# Patient Record
Sex: MALE | Race: WHITE | NOT HISPANIC OR LATINO
[De-identification: names, ages, dates, MRNs, and addresses within clinical notes are randomized per-mention and may not be internally consistent; named-entity substitution may affect disease eponyms.]

---

## 2018-04-30 PROBLEM — Z00.00 ENCOUNTER FOR PREVENTIVE HEALTH EXAMINATION: Status: ACTIVE | Noted: 2018-04-30

## 2018-05-24 ENCOUNTER — RECORD ABSTRACTING (OUTPATIENT)
Age: 68
End: 2018-05-24

## 2018-05-24 DIAGNOSIS — Z87.19 PERSONAL HISTORY OF OTHER DISEASES OF THE DIGESTIVE SYSTEM: ICD-10-CM

## 2018-05-24 DIAGNOSIS — Z85.46 PERSONAL HISTORY OF MALIGNANT NEOPLASM OF PROSTATE: ICD-10-CM

## 2018-05-24 DIAGNOSIS — Z80.0 FAMILY HISTORY OF MALIGNANT NEOPLASM OF DIGESTIVE ORGANS: ICD-10-CM

## 2018-05-24 DIAGNOSIS — Z82.49 FAMILY HISTORY OF ISCHEMIC HEART DISEASE AND OTHER DISEASES OF THE CIRCULATORY SYSTEM: ICD-10-CM

## 2018-05-25 ENCOUNTER — APPOINTMENT (OUTPATIENT)
Dept: CARDIOLOGY | Facility: CLINIC | Age: 68
End: 2018-05-25

## 2018-05-25 VITALS
BODY MASS INDEX: 21.05 KG/M2 | WEIGHT: 147 LBS | SYSTOLIC BLOOD PRESSURE: 120 MMHG | DIASTOLIC BLOOD PRESSURE: 76 MMHG | HEART RATE: 68 BPM | HEIGHT: 70 IN

## 2018-07-22 ENCOUNTER — OUTPATIENT (OUTPATIENT)
Dept: OUTPATIENT SERVICES | Facility: HOSPITAL | Age: 68
LOS: 1 days | Discharge: HOME | End: 2018-07-22

## 2018-07-22 DIAGNOSIS — M25.561 PAIN IN RIGHT KNEE: ICD-10-CM

## 2018-08-02 ENCOUNTER — APPOINTMENT (OUTPATIENT)
Dept: CARDIOLOGY | Facility: CLINIC | Age: 68
End: 2018-08-02

## 2018-09-06 ENCOUNTER — APPOINTMENT (OUTPATIENT)
Dept: CARDIOLOGY | Facility: CLINIC | Age: 68
End: 2018-09-06

## 2018-10-19 ENCOUNTER — APPOINTMENT (OUTPATIENT)
Dept: CARDIOLOGY | Facility: CLINIC | Age: 68
End: 2018-10-19

## 2018-10-22 ENCOUNTER — APPOINTMENT (OUTPATIENT)
Dept: CARDIOLOGY | Facility: CLINIC | Age: 68
End: 2018-10-22

## 2018-10-22 VITALS
HEIGHT: 70 IN | WEIGHT: 145 LBS | BODY MASS INDEX: 20.76 KG/M2 | DIASTOLIC BLOOD PRESSURE: 78 MMHG | SYSTOLIC BLOOD PRESSURE: 116 MMHG | HEART RATE: 54 BPM

## 2018-11-22 ENCOUNTER — FORM ENCOUNTER (OUTPATIENT)
Age: 68
End: 2018-11-22

## 2018-11-23 ENCOUNTER — OUTPATIENT (OUTPATIENT)
Dept: OUTPATIENT SERVICES | Facility: HOSPITAL | Age: 68
LOS: 1 days | Discharge: HOME | End: 2018-11-23

## 2018-11-23 DIAGNOSIS — R06.02 SHORTNESS OF BREATH: ICD-10-CM

## 2019-01-06 ENCOUNTER — OUTPATIENT (OUTPATIENT)
Dept: OUTPATIENT SERVICES | Facility: HOSPITAL | Age: 69
LOS: 1 days | Discharge: HOME | End: 2019-01-06

## 2019-01-06 DIAGNOSIS — K76.89 OTHER SPECIFIED DISEASES OF LIVER: ICD-10-CM

## 2019-11-02 ENCOUNTER — APPOINTMENT (OUTPATIENT)
Dept: CARDIOLOGY | Facility: CLINIC | Age: 69
End: 2019-11-02
Payer: MEDICARE

## 2019-11-02 VITALS
BODY MASS INDEX: 20.33 KG/M2 | HEIGHT: 70 IN | HEART RATE: 50 BPM | SYSTOLIC BLOOD PRESSURE: 112 MMHG | DIASTOLIC BLOOD PRESSURE: 69 MMHG | WEIGHT: 142 LBS

## 2019-11-02 PROCEDURE — 99214 OFFICE O/P EST MOD 30 MIN: CPT

## 2019-11-02 PROCEDURE — 93000 ELECTROCARDIOGRAM COMPLETE: CPT

## 2019-11-02 NOTE — DISCUSSION/SUMMARY
[FreeTextEntry1] : spoke with patient . CAC score of 0 . He did have findings in the liver. Advised him to see his gastroenterologist.

## 2019-11-02 NOTE — ASSESSMENT
[FreeTextEntry1] : The patient has had no chest pain . ETT echo was negative and he has a CAC score of 0. He does have a higher 10 year risk, He has prostate CA .

## 2019-11-02 NOTE — HISTORY OF PRESENT ILLNESS
[FreeTextEntry1] : The patient has been feeling well. He had been on Lupron  for prostate CA . He had a cac score of 0.

## 2019-11-02 NOTE — PHYSICAL EXAM
[General Appearance - Well Developed] : well developed [Normal Appearance] : normal appearance [Well Groomed] : well groomed [General Appearance - Well Nourished] : well nourished [No Deformities] : no deformities [General Appearance - In No Acute Distress] : no acute distress [Normal Conjunctiva] : the conjunctiva exhibited no abnormalities [Eyelids - No Xanthelasma] : the eyelids demonstrated no xanthelasmas [Normal Oral Mucosa] : normal oral mucosa [No Oral Pallor] : no oral pallor [No Oral Cyanosis] : no oral cyanosis [Abdomen Soft] : soft [Abdomen Tenderness] : non-tender [Abdomen Mass (___ Cm)] : no abdominal mass palpated [Abnormal Walk] : normal gait [Gait - Sufficient For Exercise Testing] : the gait was sufficient for exercise testing [Nail Clubbing] : no clubbing of the fingernails [Cyanosis, Localized] : no localized cyanosis [Petechial Hemorrhages (___cm)] : no petechial hemorrhages [Skin Color & Pigmentation] : normal skin color and pigmentation [] : no rash [No Venous Stasis] : no venous stasis [Skin Lesions] : no skin lesions [No Skin Ulcers] : no skin ulcer [No Xanthoma] : no  xanthoma was observed [Oriented To Time, Place, And Person] : oriented to person, place, and time [Affect] : the affect was normal [Mood] : the mood was normal [No Anxiety] : not feeling anxious [Normal Rate] : normal [Rhythm Regular] : regular [No Murmur] : no murmurs heard [2+] : left 2+ [___ +] : bilateral [unfilled]U+ pitting edema to the ankles [FreeTextEntry1] : No JVD  [Rt] : no varicose veins of the right leg [Lt] : no varicose veins of the left leg

## 2020-12-02 ENCOUNTER — APPOINTMENT (OUTPATIENT)
Dept: CARDIOLOGY | Facility: CLINIC | Age: 70
End: 2020-12-02
Payer: MEDICARE

## 2020-12-02 VITALS
TEMPERATURE: 97.3 F | HEART RATE: 61 BPM | HEIGHT: 70 IN | SYSTOLIC BLOOD PRESSURE: 118 MMHG | WEIGHT: 133 LBS | BODY MASS INDEX: 19.04 KG/M2 | DIASTOLIC BLOOD PRESSURE: 60 MMHG

## 2020-12-02 PROCEDURE — 99214 OFFICE O/P EST MOD 30 MIN: CPT

## 2020-12-02 PROCEDURE — 93000 ELECTROCARDIOGRAM COMPLETE: CPT

## 2020-12-15 ENCOUNTER — TRANSCRIPTION ENCOUNTER (OUTPATIENT)
Age: 70
End: 2020-12-15

## 2021-11-11 ENCOUNTER — RESULT CHARGE (OUTPATIENT)
Age: 71
End: 2021-11-11

## 2021-11-11 ENCOUNTER — APPOINTMENT (OUTPATIENT)
Dept: CARDIOLOGY | Facility: CLINIC | Age: 71
End: 2021-11-11
Payer: MEDICARE

## 2021-11-11 VITALS
TEMPERATURE: 97.3 F | DIASTOLIC BLOOD PRESSURE: 60 MMHG | WEIGHT: 135 LBS | BODY MASS INDEX: 19.33 KG/M2 | HEART RATE: 54 BPM | SYSTOLIC BLOOD PRESSURE: 100 MMHG | HEIGHT: 70 IN

## 2021-11-11 DIAGNOSIS — R60.9 EDEMA, UNSPECIFIED: ICD-10-CM

## 2021-11-11 PROCEDURE — 99214 OFFICE O/P EST MOD 30 MIN: CPT

## 2021-11-11 PROCEDURE — 93000 ELECTROCARDIOGRAM COMPLETE: CPT

## 2021-11-11 NOTE — CARDIOLOGY SUMMARY
[___] : [unfilled] [de-identified] : 11- Sinus Bradycardia LAFB Sinus arrythmia  [de-identified] : From Dr. Kelly's office 9-8-2021 Normal Lv systolic function . trace MR and TR .

## 2021-11-11 NOTE — ASSESSMENT
[FreeTextEntry1] : The patient has had no chest pain . ETT echo was negative and he has a CAC score of 0. He does have a higher 10 year risk, He has prostate CA . LDL is now at goal.

## 2021-11-11 NOTE — REASON FOR VISIT
[CV Risk Factors and Non-Cardiac Disease] : CV risk factors and non-cardiac disease [FreeTextEntry3] : raciel

## 2022-11-17 ENCOUNTER — APPOINTMENT (OUTPATIENT)
Dept: CARDIOLOGY | Facility: CLINIC | Age: 72
End: 2022-11-17

## 2022-11-17 VITALS
OXYGEN SATURATION: 98 % | HEIGHT: 70 IN | WEIGHT: 128 LBS | BODY MASS INDEX: 18.32 KG/M2 | SYSTOLIC BLOOD PRESSURE: 106 MMHG | HEART RATE: 52 BPM | DIASTOLIC BLOOD PRESSURE: 62 MMHG | TEMPERATURE: 97.6 F

## 2022-11-17 DIAGNOSIS — E78.5 HYPERLIPIDEMIA, UNSPECIFIED: ICD-10-CM

## 2022-11-17 PROCEDURE — 99214 OFFICE O/P EST MOD 30 MIN: CPT

## 2022-11-17 PROCEDURE — 93000 ELECTROCARDIOGRAM COMPLETE: CPT

## 2022-11-17 NOTE — CARDIOLOGY SUMMARY
[___] : [unfilled] [de-identified] : 11- Sinus Bradycardia ARIAB Sinus arrythmia \par 11- ANDRE ARREOLA  [de-identified] : From Dr. Kelly's office 9-8-2021 Normal Lv systolic function . trace MR and TR .

## 2022-11-17 NOTE — ASSESSMENT
[FreeTextEntry1] : The patinet has been feeling well. He has normal BP and LDL is at goal. He exercises at the gym daily without issues .

## 2022-11-17 NOTE — HISTORY OF PRESENT ILLNESS
[FreeTextEntry1] : The patient has been feeling well. No chest pain No SOB . No change LDL has been at goal

## 2022-12-03 ENCOUNTER — OUTPATIENT (OUTPATIENT)
Dept: OUTPATIENT SERVICES | Facility: HOSPITAL | Age: 72
LOS: 1 days | Discharge: HOME | End: 2022-12-03

## 2022-12-03 DIAGNOSIS — E78.5 HYPERLIPIDEMIA, UNSPECIFIED: ICD-10-CM

## 2022-12-03 PROCEDURE — 75571 CT HRT W/O DYE W/CA TEST: CPT | Mod: 26,MH

## 2023-07-24 ENCOUNTER — APPOINTMENT (OUTPATIENT)
Dept: ORTHOPEDIC SURGERY | Facility: CLINIC | Age: 73
End: 2023-07-24
Payer: MEDICARE

## 2023-07-24 VITALS — BODY MASS INDEX: 19.26 KG/M2 | WEIGHT: 130 LBS | HEIGHT: 69 IN

## 2023-07-24 PROCEDURE — 99202 OFFICE O/P NEW SF 15 MIN: CPT

## 2023-07-24 NOTE — HISTORY OF PRESENT ILLNESS
[de-identified] : 73-year-old male comes the office today for evaluation regarding his left hand numbness and tingling into the left hand can wake him up from sleep he reports the numbness comes from his shoulder on down to the hand not the other direction.  Has had no treatment or evaluation for the condition.

## 2023-07-24 NOTE — PHYSICAL EXAM
[de-identified] : Patient has negative Tinel's and median nerve compression test bilaterally.  Patient has no swelling erythema ecchymoses or abrasions.  Patient has no masses.  He has no exacerbation of symptoms with flexion or extension of the neck or rotation of the neck as well.

## 2023-07-24 NOTE — ASSESSMENT
[FreeTextEntry1] : Patient may have left-sided carpal tunnel syndrome.  Though the distribution of in the process with the numbness is not classic for that.  Discussed with him getting an EMG test as this would be the test for evaluation of this.  He will contact us back after testing is performed.

## 2023-08-14 ENCOUNTER — APPOINTMENT (OUTPATIENT)
Dept: NEUROLOGY | Facility: CLINIC | Age: 73
End: 2023-08-14
Payer: MEDICARE

## 2023-08-14 PROCEDURE — 95911 NRV CNDJ TEST 9-10 STUDIES: CPT

## 2023-08-14 PROCEDURE — 95886 MUSC TEST DONE W/N TEST COMP: CPT

## 2023-11-16 ENCOUNTER — APPOINTMENT (OUTPATIENT)
Dept: CARDIOLOGY | Facility: CLINIC | Age: 73
End: 2023-11-16
Payer: MEDICARE

## 2023-11-16 VITALS — BODY MASS INDEX: 18.46 KG/M2 | WEIGHT: 125 LBS

## 2023-11-16 VITALS
HEIGHT: 69 IN | DIASTOLIC BLOOD PRESSURE: 74 MMHG | SYSTOLIC BLOOD PRESSURE: 120 MMHG | HEART RATE: 60 BPM | TEMPERATURE: 97.6 F

## 2023-11-16 DIAGNOSIS — Z91.89 OTHER SPECIFIED PERSONAL RISK FACTORS, NOT ELSEWHERE CLASSIFIED: ICD-10-CM

## 2023-11-16 DIAGNOSIS — E78.5 HYPERLIPIDEMIA, UNSPECIFIED: ICD-10-CM

## 2023-11-16 PROCEDURE — 93000 ELECTROCARDIOGRAM COMPLETE: CPT

## 2023-11-16 PROCEDURE — 99214 OFFICE O/P EST MOD 30 MIN: CPT

## 2024-02-17 ENCOUNTER — APPOINTMENT (OUTPATIENT)
Dept: ORTHOPEDIC SURGERY | Facility: CLINIC | Age: 74
End: 2024-02-17
Payer: MEDICARE

## 2024-02-17 VITALS — BODY MASS INDEX: 19.26 KG/M2 | WEIGHT: 130 LBS | HEIGHT: 69 IN

## 2024-02-17 PROCEDURE — 29085 APPL CAST HAND&LWR FOREARM: CPT | Mod: RT

## 2024-02-17 PROCEDURE — 99213 OFFICE O/P EST LOW 20 MIN: CPT | Mod: 25

## 2024-02-22 NOTE — DATA REVIEWED
[FreeTextEntry1] : X-rays of the right wrist were reviewed from [facility] which reveal an acute closed nondisplaced right scaphoid waist fracture.  No subluxations or dislocations.

## 2024-02-22 NOTE — IMAGING
[de-identified] : Physical examination of right wrist: Mild swelling appreciated throughout.  Mild ecchymosis appreciated over the distal radius.  No erythema appreciated.  Skin is intact.  Tense palpation over the right distal radius.  Significant tenderness over the scaphoid appreciated.  Decreased range of motion secondary to pain and swelling.  Sensorimotor intact distally.  Neuro vas intact.  No tenderness of the DRUJ.  No tenderness at the TFCC.  Range of motion limited secondary to pain and swelling.

## 2024-02-22 NOTE — DISCUSSION/SUMMARY
[de-identified] : Patient has an acute closed nondisplaced fracture of the scaphoid of the right wrist as evidenced on x-rays from [facility].  I placed the patient in a well molded well-fitting fiberglass thumb spica cast in the office today.  He tolerated casting procedure well.  Will send the patient for a CAT scan of the right wrist to further evaluate for the level of displacement, if any, of his current scaphoid fracture.  Treatment plans were discussed with patient that which include conservative management with casting and/or surgery if applicable pending his CAT scan results.  Patient expresses full understanding.  Red flag symptoms discussed.  He understands not get the cast wet.  I have the patient follow-up ASAP with [facility] following his CAT scan to discuss further evaluation and treatment.  All questions and concerns addressed to patient's satisfaction. Patient expresses full understanding of treatment plan.

## 2024-02-22 NOTE — HISTORY OF PRESENT ILLNESS
[de-identified] : 74-year-old male here for evaluation status post a fall on outstretched right wrist with a subsequent right scaphoid fracture.  He was seen at [facility] where x-rays taken which confirmed an acute closed nondisplaced fracture of the waist of the scaphoid of the right wrist.  He was placed in a splint and advised to follow-up in orthopedic specialist.  His pain is well-controlled at this time.

## 2024-02-23 ENCOUNTER — APPOINTMENT (OUTPATIENT)
Dept: ORTHOPEDIC SURGERY | Facility: CLINIC | Age: 74
End: 2024-02-23
Payer: MEDICARE

## 2024-02-23 PROCEDURE — 99202 OFFICE O/P NEW SF 15 MIN: CPT

## 2024-02-23 NOTE — DATA REVIEWED
[FreeTextEntry1] : Radiographs 3 views of the left wrist are reviewed from his previous visit to document a nondisplaced scaphoid fracture.  And his CAT scan is reviewed as well.  Films.  Documenting the nondisplaced nature of the scaphoid fracture with significant STT arthritis

## 2024-02-23 NOTE — HISTORY OF PRESENT ILLNESS
[de-identified] : 74-year-old male had fallen resulting injury to his right wrist.  Comes in today for evaluation.  X-rays and CAT scan confirmed the presence of a nondisplaced scaphoid fracture.  Patient has been seen in the past as well for carpal tunnel syndrome.  He is reporting intermittent numbness and tingling in the fingers.

## 2024-02-23 NOTE — PHYSICAL EXAM
[de-identified] : Patient is in a short arm thumb spica cast.  He has good range of motion to the fingers.  He does have mildly positive Tinel's and median nerve compression test on the left side.  Good thenar strength without thenar atrophy.

## 2024-03-29 ENCOUNTER — APPOINTMENT (OUTPATIENT)
Dept: ORTHOPEDIC SURGERY | Facility: CLINIC | Age: 74
End: 2024-03-29
Payer: MEDICARE

## 2024-03-29 DIAGNOSIS — G56.02 CARPAL TUNNEL SYNDROME, LEFT UPPER LIMB: ICD-10-CM

## 2024-03-29 PROCEDURE — L3908: CPT | Mod: KX,RT

## 2024-03-29 PROCEDURE — 99213 OFFICE O/P EST LOW 20 MIN: CPT

## 2024-03-29 PROCEDURE — 73110 X-RAY EXAM OF WRIST: CPT | Mod: RT

## 2024-03-29 NOTE — DATA REVIEWED
[FreeTextEntry1] : Radiographs 4 views of the right wrist reviewed showing healing of his scaphoid fracture there is no lucency around the scaphoid there is significant STT arthritis with an extended lunate  EMG test is reviewed which shows left worse than right carpal tunnel syndrome

## 2024-03-29 NOTE — PHYSICAL EXAM
[de-identified] : Patient is removed from his immobilization.  Patient has no tenderness about the scaphoid.  There is deformity consistent with arthritis.  There is no erythema ecchymoses or abrasions.  His skin is intact.  On the left side he has mildly positive Tinel's median nerve compression test with good range of motion to the fingers

## 2024-03-29 NOTE — HISTORY OF PRESENT ILLNESS
[de-identified] : 74-year-old male has a right-sided scaphoid fracture.  He was treated in a cast.  He is coming out of the cast today.  He also is complaining about numbness and tingling on the left hand.  It does wake him up and sleep at night as well.  This has been going on for several months.

## 2024-03-29 NOTE — ASSESSMENT
[FreeTextEntry1] : Patient is contemplating intervention for his carpal tunnel syndrome.  Surgical intervention for treatment was discussed.  Recovery was discussed.  He is contemplating getting that procedure done.  In terms of his right wrist he will go into a removable wrist brace.  Start range of motion exercise and stretching exercises.  Any other issues or problems contact the office he will see us in 6 weeks with a repeat radiograph of his right wrist

## 2024-05-10 ENCOUNTER — APPOINTMENT (OUTPATIENT)
Dept: ORTHOPEDIC SURGERY | Facility: CLINIC | Age: 74
End: 2024-05-10
Payer: MEDICARE

## 2024-05-10 DIAGNOSIS — S62.024A NONDISPLACED FRACTURE OF MIDDLE THIRD OF NAVICULAR [SCAPHOID] BONE OF RIGHT WRIST, INITIAL ENCOUNTER FOR CLOSED FRACTURE: ICD-10-CM

## 2024-05-10 PROCEDURE — 73110 X-RAY EXAM OF WRIST: CPT | Mod: RT

## 2024-05-10 PROCEDURE — 99213 OFFICE O/P EST LOW 20 MIN: CPT

## 2024-05-10 NOTE — HISTORY OF PRESENT ILLNESS
[de-identified] : 74-year-old male is being treated for a right-sided scaphoid fracture.  He also is having numbness and tingling in the fingers.  Mostly tingling does not bother him as much anymore.  And his wrist pain is not significant at this time.

## 2024-05-10 NOTE — ASSESSMENT
[FreeTextEntry1] : Patient had a scaphoid fracture in light of a significant wrist with STT arthritis he also had left-sided carpal tunnel syndrome.  No surgical intervention being done for either condition.  He will see us back on an as-needed basis.  Indications for surgery discussed again with the patient.

## 2024-05-10 NOTE — PHYSICAL EXAM
[de-identified] : Patient is good range of motion to the wrist and hand.  He has no significant tenderness in the anatomic snuffbox.  He does have some deformity consistent with STT arthritis.  No erythema ecchymoses or abrasions

## 2024-05-10 NOTE — DATA REVIEWED
[FreeTextEntry1] : Radiographs 3 views of the right wrist are reviewed showing significant STT arthritis but healed scaphoid fracture

## 2024-07-09 DIAGNOSIS — Z86.39 PERSONAL HISTORY OF OTHER ENDOCRINE, NUTRITIONAL AND METABOLIC DISEASE: ICD-10-CM

## 2024-11-07 ENCOUNTER — APPOINTMENT (OUTPATIENT)
Dept: CARDIOLOGY | Facility: CLINIC | Age: 74
End: 2024-11-07
Payer: MEDICARE

## 2024-11-07 VITALS — HEART RATE: 60 BPM | DIASTOLIC BLOOD PRESSURE: 70 MMHG | SYSTOLIC BLOOD PRESSURE: 118 MMHG

## 2024-11-07 VITALS — BODY MASS INDEX: 18.81 KG/M2 | HEIGHT: 69 IN | WEIGHT: 127 LBS

## 2024-11-07 PROCEDURE — 99214 OFFICE O/P EST MOD 30 MIN: CPT

## 2024-11-07 PROCEDURE — 93000 ELECTROCARDIOGRAM COMPLETE: CPT

## 2024-11-07 RX ORDER — ENZALUTAMIDE 40 MG/1
40 TABLET ORAL
Refills: 0 | Status: ACTIVE | COMMUNITY

## 2024-11-07 RX ORDER — RELUGOLIX 120 MG/1
120 TABLET, FILM COATED ORAL
Refills: 0 | Status: ACTIVE | COMMUNITY

## 2025-08-16 ENCOUNTER — NON-APPOINTMENT (OUTPATIENT)
Age: 75
End: 2025-08-16

## 2025-08-18 ENCOUNTER — APPOINTMENT (OUTPATIENT)
Dept: CARDIOLOGY | Facility: CLINIC | Age: 75
End: 2025-08-18

## 2025-08-18 VITALS — HEIGHT: 69 IN | WEIGHT: 130 LBS | BODY MASS INDEX: 19.26 KG/M2

## 2025-08-18 VITALS — DIASTOLIC BLOOD PRESSURE: 80 MMHG | SYSTOLIC BLOOD PRESSURE: 118 MMHG | HEART RATE: 48 BPM

## 2025-08-18 DIAGNOSIS — Z91.89 OTHER SPECIFIED PERSONAL RISK FACTORS, NOT ELSEWHERE CLASSIFIED: ICD-10-CM

## 2025-08-18 DIAGNOSIS — E78.5 HYPERLIPIDEMIA, UNSPECIFIED: ICD-10-CM

## 2025-08-18 DIAGNOSIS — R60.9 EDEMA, UNSPECIFIED: ICD-10-CM

## 2025-08-18 PROCEDURE — 99214 OFFICE O/P EST MOD 30 MIN: CPT

## 2025-08-18 PROCEDURE — 93000 ELECTROCARDIOGRAM COMPLETE: CPT
